# Patient Record
Sex: FEMALE | Race: WHITE | NOT HISPANIC OR LATINO | Employment: FULL TIME | URBAN - METROPOLITAN AREA
[De-identification: names, ages, dates, MRNs, and addresses within clinical notes are randomized per-mention and may not be internally consistent; named-entity substitution may affect disease eponyms.]

---

## 2023-08-14 ENCOUNTER — HOSPITAL ENCOUNTER (OUTPATIENT)
Dept: DATA CONVERSION | Facility: HOSPITAL | Age: 63
Discharge: HOME | End: 2023-08-14

## 2023-08-14 DIAGNOSIS — R87.811 VAGINAL HIGH RISK HUMAN PAPILLOMAVIRUS (HPV) DNA TEST POSITIVE: ICD-10-CM

## 2023-08-14 DIAGNOSIS — R87.620 ATYPICAL SQUAMOUS CELLS OF UNDETERMINED SIGNIFICANCE ON CYTOLOGIC SMEAR OF VAGINA (ASC-US): ICD-10-CM

## 2023-08-21 ENCOUNTER — HOSPITAL ENCOUNTER (OUTPATIENT)
Dept: DATA CONVERSION | Facility: HOSPITAL | Age: 63
End: 2023-08-21

## 2023-08-21 DIAGNOSIS — Z12.31 ENCOUNTER FOR SCREENING MAMMOGRAM FOR MALIGNANT NEOPLASM OF BREAST: ICD-10-CM

## 2023-09-03 PROBLEM — R70.0 ELEVATED ERYTHROCYTE SEDIMENTATION RATE: Status: ACTIVE | Noted: 2023-09-03

## 2023-09-03 PROBLEM — R19.5 POSITIVE COLORECTAL CANCER SCREENING USING COLOGUARD TEST: Status: ACTIVE | Noted: 2022-07-18

## 2023-09-03 PROBLEM — L23.7 CONTACT DERMATITIS DUE TO POISON IVY: Status: ACTIVE | Noted: 2023-09-03

## 2023-09-03 PROBLEM — E55.9 VITAMIN D DEFICIENCY: Status: ACTIVE | Noted: 2023-09-03

## 2023-09-03 PROBLEM — M25.50 ARTHRALGIA: Status: ACTIVE | Noted: 2022-05-13

## 2023-09-03 PROBLEM — M06.09 POLYARTHRITIS WITH NEGATIVE RHEUMATOID FACTOR (MULTI): Status: ACTIVE | Noted: 2023-09-03

## 2023-09-03 PROBLEM — L40.50 ARTHRITIS WITH PSORIASIS (MULTI): Status: ACTIVE | Noted: 2023-09-03

## 2023-09-03 PROBLEM — L40.9 PSORIASIS: Status: ACTIVE | Noted: 2022-05-13

## 2023-09-03 PROBLEM — A63.0 ANOGENITAL WARTS: Status: ACTIVE | Noted: 2023-09-03

## 2023-09-03 PROBLEM — R35.0 URINARY FREQUENCY: Status: ACTIVE | Noted: 2022-07-18

## 2023-09-03 PROBLEM — R89.6 ABNORMAL CYTOLOGICAL FINDINGS IN SPECIMENS FROM OTHER ORGANS, SYSTEMS AND TISSUES: Status: ACTIVE | Noted: 2023-09-03

## 2023-09-03 PROBLEM — R87.619 ABNORMAL CERVICAL PAPANICOLAOU SMEAR: Status: ACTIVE | Noted: 2023-09-03

## 2023-09-03 RX ORDER — ERGOCALCIFEROL 1.25 MG/1
1 CAPSULE ORAL
COMMUNITY
Start: 2023-02-27

## 2023-09-03 RX ORDER — APREMILAST 30 MG/1
30 TABLET, FILM COATED ORAL 2 TIMES DAILY
COMMUNITY
Start: 2022-10-05 | End: 2024-02-07 | Stop reason: SDUPTHER

## 2023-09-03 RX ORDER — IBUPROFEN 100 MG/5ML
1 SUSPENSION, ORAL (FINAL DOSE FORM) ORAL DAILY
COMMUNITY

## 2023-09-03 RX ORDER — NYSTATIN 100000 U/G
CREAM TOPICAL 2 TIMES DAILY
COMMUNITY
Start: 2016-09-13 | End: 2024-02-07 | Stop reason: WASHOUT

## 2023-09-03 RX ORDER — MELOXICAM 15 MG/1
1 TABLET ORAL DAILY
COMMUNITY
Start: 2022-10-19 | End: 2024-02-07 | Stop reason: WASHOUT

## 2023-09-03 RX ORDER — FLUCONAZOLE 150 MG/1
1 TABLET ORAL DAILY
COMMUNITY
Start: 2016-09-13 | End: 2024-02-07 | Stop reason: ALTCHOICE

## 2023-09-16 VITALS
DIASTOLIC BLOOD PRESSURE: 80 MMHG | SYSTOLIC BLOOD PRESSURE: 122 MMHG | WEIGHT: 170.1 LBS | BODY MASS INDEX: 30.14 KG/M2 | HEIGHT: 63 IN

## 2023-10-02 ENCOUNTER — APPOINTMENT (OUTPATIENT)
Dept: RADIOLOGY | Facility: HOSPITAL | Age: 63
End: 2023-10-02
Payer: COMMERCIAL

## 2023-10-02 ENCOUNTER — HOSPITAL ENCOUNTER (OUTPATIENT)
Dept: RADIOLOGY | Facility: HOSPITAL | Age: 63
Discharge: HOME | End: 2023-10-02
Payer: COMMERCIAL

## 2023-10-02 VITALS — BODY MASS INDEX: 29.23 KG/M2 | WEIGHT: 165 LBS | HEIGHT: 63 IN

## 2023-10-02 DIAGNOSIS — Z12.31 ENCOUNTER FOR SCREENING MAMMOGRAM FOR MALIGNANT NEOPLASM OF BREAST: ICD-10-CM

## 2023-10-02 PROCEDURE — 77067 SCR MAMMO BI INCL CAD: CPT | Mod: 50

## 2023-10-02 PROCEDURE — 77063 BREAST TOMOSYNTHESIS BI: CPT | Mod: 50

## 2023-10-17 ENCOUNTER — TELEPHONE (OUTPATIENT)
Dept: RHEUMATOLOGY | Facility: CLINIC | Age: 63
End: 2023-10-17
Payer: COMMERCIAL

## 2023-10-17 DIAGNOSIS — L40.9 PSORIASIS: Primary | ICD-10-CM

## 2023-10-17 NOTE — TELEPHONE ENCOUNTER
PT LEFT VM STATING SHE WOULD LIKE TO TRY OTEZLA. STATES YOU ORDERED LAST YEAR & SHE NEVER STARTED, NOW SXS ARE WORSE & SHE WOULD LIKE TO TRY IT. PLEASE ADVISE. LAST VISIT WAS 7/24/23 & SCHEDULED FOR 12/18/23.

## 2023-10-19 RX ORDER — APREMILAST 30 MG/1
30 TABLET, FILM COATED ORAL 2 TIMES DAILY
Qty: 60 TABLET | Refills: 11 | Status: SHIPPED | OUTPATIENT
Start: 2023-10-19 | End: 2024-03-22 | Stop reason: SDUPTHER

## 2023-10-20 ENCOUNTER — SPECIALTY PHARMACY (OUTPATIENT)
Dept: PHARMACY | Facility: CLINIC | Age: 63
End: 2023-10-20

## 2023-11-01 ENCOUNTER — SPECIALTY PHARMACY (OUTPATIENT)
Dept: PHARMACY | Facility: CLINIC | Age: 63
End: 2023-11-01

## 2023-11-01 ENCOUNTER — PHARMACY VISIT (OUTPATIENT)
Dept: PHARMACY | Facility: CLINIC | Age: 63
End: 2023-11-01
Payer: COMMERCIAL

## 2023-11-01 PROCEDURE — RXMED WILLOW AMBULATORY MEDICATION CHARGE

## 2023-11-03 ENCOUNTER — SPECIALTY PHARMACY (OUTPATIENT)
Dept: PHARMACY | Facility: CLINIC | Age: 63
End: 2023-11-03

## 2023-11-03 NOTE — PROGRESS NOTES
[facility] Clinical Note    Amber Valdez is a 62 y.o. female, who is on the specialty pharmacy service for management of: Dermatology Core with status of: (Enrolled)     Amber was contacted on 11/3/2023.    Refer to the encounter summary report for documentation details about patient counseling and education.      Medication Adherence  The importance of adherence was discussed with the patient and they were advised to take the medication as prescribed by their provider. Amber was encouraged to call her physician's office if they have a question regarding a missed dose.     Conclusion  Rate your quality of life on scale of 1-10: -- (unable to assess)  Rate your satisfaction with  Specialty Pharmacy on scale of 1-10: 10 - Completely satisfied      Patient advised to contact the pharmacy if there are any changes to her medication list, including prescriptions, OTC medications, herbal products, or supplements. Patient was advised of [facility]’s dispensing process, refill timeline, contact information ([phone]), and patient management follow up. Patient confirmed understanding of education conducted during assessment. All patient questions and concerns were addressed to the best of my ability. Patient was encouraged to contact the specialty pharmacy with any questions or concerns.    Confirmed follow-up outreaches are properly scheduled. Reviewed goals of therapy in the program targets.    Jacob Portillo, PharmD

## 2023-11-29 ENCOUNTER — LAB (OUTPATIENT)
Dept: LAB | Facility: LAB | Age: 63
End: 2023-11-29
Payer: COMMERCIAL

## 2023-11-29 DIAGNOSIS — L40.9 PSORIASIS, UNSPECIFIED: ICD-10-CM

## 2023-11-29 DIAGNOSIS — E55.9 VITAMIN D DEFICIENCY, UNSPECIFIED: ICD-10-CM

## 2023-11-29 DIAGNOSIS — M06.09 RHEUMATOID ARTHRITIS WITHOUT RHEUMATOID FACTOR, MULTIPLE SITES (MULTI): Primary | ICD-10-CM

## 2023-11-29 LAB
25(OH)D3 SERPL-MCNC: 47 NG/ML (ref 31–100)
ALBUMIN SERPL-MCNC: 4.5 G/DL (ref 3.5–5)
ALP BLD-CCNC: 114 U/L (ref 35–125)
ALT SERPL-CCNC: 23 U/L (ref 5–40)
ANION GAP SERPL CALC-SCNC: 11 MMOL/L
APPEARANCE UR: CLEAR
AST SERPL-CCNC: 25 U/L (ref 5–40)
BACTERIA #/AREA URNS AUTO: ABNORMAL /HPF
BASOPHILS # BLD AUTO: 0.05 X10*3/UL (ref 0–0.1)
BASOPHILS NFR BLD AUTO: 0.7 %
BILIRUB SERPL-MCNC: 0.3 MG/DL (ref 0.1–1.2)
BILIRUB UR STRIP.AUTO-MCNC: NEGATIVE MG/DL
BUN SERPL-MCNC: 12 MG/DL (ref 8–25)
CALCIUM SERPL-MCNC: 9.2 MG/DL (ref 8.5–10.4)
CHLORIDE SERPL-SCNC: 102 MMOL/L (ref 97–107)
CK SERPL-CCNC: 61 U/L (ref 24–195)
CO2 SERPL-SCNC: 26 MMOL/L (ref 24–31)
COLOR UR: COLORLESS
CREAT SERPL-MCNC: 0.9 MG/DL (ref 0.4–1.6)
EOSINOPHIL # BLD AUTO: 0.06 X10*3/UL (ref 0–0.7)
EOSINOPHIL NFR BLD AUTO: 0.8 %
ERYTHROCYTE [DISTWIDTH] IN BLOOD BY AUTOMATED COUNT: 12 % (ref 11.5–14.5)
GFR SERPL CREATININE-BSD FRML MDRD: 73 ML/MIN/1.73M*2
GLUCOSE SERPL-MCNC: 92 MG/DL (ref 65–99)
GLUCOSE UR STRIP.AUTO-MCNC: NORMAL MG/DL
HCT VFR BLD AUTO: 41.6 % (ref 36–46)
HGB BLD-MCNC: 13.5 G/DL (ref 12–16)
HOLD SPECIMEN: NORMAL
IMM GRANULOCYTES # BLD AUTO: 0.02 X10*3/UL (ref 0–0.7)
IMM GRANULOCYTES NFR BLD AUTO: 0.3 % (ref 0–0.9)
KETONES UR STRIP.AUTO-MCNC: NEGATIVE MG/DL
LEUKOCYTE ESTERASE UR QL STRIP.AUTO: ABNORMAL
LYMPHOCYTES # BLD AUTO: 2.53 X10*3/UL (ref 1.2–4.8)
LYMPHOCYTES NFR BLD AUTO: 34.3 %
MCH RBC QN AUTO: 30.3 PG (ref 26–34)
MCHC RBC AUTO-ENTMCNC: 32.5 G/DL (ref 32–36)
MCV RBC AUTO: 93 FL (ref 80–100)
MONOCYTES # BLD AUTO: 0.53 X10*3/UL (ref 0.1–1)
MONOCYTES NFR BLD AUTO: 7.2 %
NEUTROPHILS # BLD AUTO: 4.18 X10*3/UL (ref 1.2–7.7)
NEUTROPHILS NFR BLD AUTO: 56.7 %
NITRITE UR QL STRIP.AUTO: NEGATIVE
NRBC BLD-RTO: 0 /100 WBCS (ref 0–0)
PH UR STRIP.AUTO: 6 [PH]
PLATELET # BLD AUTO: 255 X10*3/UL (ref 150–450)
POTASSIUM SERPL-SCNC: 3.8 MMOL/L (ref 3.4–5.1)
PROT SERPL-MCNC: 7.5 G/DL (ref 5.9–7.9)
PROT UR STRIP.AUTO-MCNC: NEGATIVE MG/DL
RBC # BLD AUTO: 4.46 X10*6/UL (ref 4–5.2)
RBC # UR STRIP.AUTO: NEGATIVE /UL
RBC #/AREA URNS AUTO: ABNORMAL /HPF
SODIUM SERPL-SCNC: 139 MMOL/L (ref 133–145)
SP GR UR STRIP.AUTO: 1
SQUAMOUS #/AREA URNS AUTO: ABNORMAL /HPF
UROBILINOGEN UR STRIP.AUTO-MCNC: NORMAL MG/DL
WBC # BLD AUTO: 7.4 X10*3/UL (ref 4.4–11.3)
WBC #/AREA URNS AUTO: ABNORMAL /HPF

## 2023-11-29 PROCEDURE — 81490 AUTOIMMUNE RA ALYS 12 BMRK: CPT

## 2023-11-29 PROCEDURE — 36415 COLL VENOUS BLD VENIPUNCTURE: CPT

## 2023-11-29 PROCEDURE — 82550 ASSAY OF CK (CPK): CPT

## 2023-11-29 PROCEDURE — 81001 URINALYSIS AUTO W/SCOPE: CPT

## 2023-11-29 PROCEDURE — 85025 COMPLETE CBC W/AUTO DIFF WBC: CPT

## 2023-11-29 PROCEDURE — 87086 URINE CULTURE/COLONY COUNT: CPT

## 2023-11-29 PROCEDURE — 82306 VITAMIN D 25 HYDROXY: CPT

## 2023-11-29 PROCEDURE — 83529 ASAY OF INTERLEUKIN-6 (IL-6): CPT

## 2023-11-29 PROCEDURE — 80053 COMPREHEN METABOLIC PANEL: CPT

## 2023-11-30 ENCOUNTER — SPECIALTY PHARMACY (OUTPATIENT)
Dept: PHARMACY | Facility: CLINIC | Age: 63
End: 2023-11-30

## 2023-11-30 ENCOUNTER — PHARMACY VISIT (OUTPATIENT)
Dept: PHARMACY | Facility: CLINIC | Age: 63
End: 2023-11-30
Payer: COMMERCIAL

## 2023-11-30 PROCEDURE — RXMED WILLOW AMBULATORY MEDICATION CHARGE

## 2023-12-01 LAB
BACTERIA UR CULT: NORMAL
IL6 SERPL-MCNC: <2 PG/ML

## 2023-12-04 LAB — SCAN RESULT: NORMAL

## 2023-12-08 ENCOUNTER — SPECIALTY PHARMACY (OUTPATIENT)
Dept: PHARMACY | Facility: CLINIC | Age: 63
End: 2023-12-08

## 2023-12-18 ENCOUNTER — APPOINTMENT (OUTPATIENT)
Dept: RHEUMATOLOGY | Facility: CLINIC | Age: 63
End: 2023-12-18
Payer: COMMERCIAL

## 2024-01-05 ENCOUNTER — SPECIALTY PHARMACY (OUTPATIENT)
Dept: PHARMACY | Facility: CLINIC | Age: 64
End: 2024-01-05

## 2024-01-05 PROCEDURE — RXMED WILLOW AMBULATORY MEDICATION CHARGE

## 2024-01-10 ENCOUNTER — PHARMACY VISIT (OUTPATIENT)
Dept: PHARMACY | Facility: CLINIC | Age: 64
End: 2024-01-10
Payer: COMMERCIAL

## 2024-02-01 PROCEDURE — RXMED WILLOW AMBULATORY MEDICATION CHARGE

## 2024-02-06 PROBLEM — R87.811 VAGINAL HIGH RISK HUMAN PAPILLOMAVIRUS (HPV) DNA TEST POSITIVE: Status: ACTIVE | Noted: 2024-02-06

## 2024-02-06 RX ORDER — SULFASALAZINE 500 MG/1
TABLET ORAL
COMMUNITY
Start: 2023-07-24 | End: 2024-02-07 | Stop reason: SINTOL

## 2024-02-07 ENCOUNTER — SPECIALTY PHARMACY (OUTPATIENT)
Dept: PHARMACY | Facility: CLINIC | Age: 64
End: 2024-02-07

## 2024-02-07 ENCOUNTER — OFFICE VISIT (OUTPATIENT)
Dept: RHEUMATOLOGY | Facility: CLINIC | Age: 64
End: 2024-02-07
Payer: COMMERCIAL

## 2024-02-07 VITALS
DIASTOLIC BLOOD PRESSURE: 68 MMHG | HEART RATE: 80 BPM | WEIGHT: 168.87 LBS | OXYGEN SATURATION: 98 % | HEIGHT: 64 IN | SYSTOLIC BLOOD PRESSURE: 126 MMHG | BODY MASS INDEX: 28.83 KG/M2

## 2024-02-07 DIAGNOSIS — E55.9 VITAMIN D DEFICIENCY: ICD-10-CM

## 2024-02-07 DIAGNOSIS — M06.09 POLYARTHRITIS WITH NEGATIVE RHEUMATOID FACTOR (MULTI): Primary | ICD-10-CM

## 2024-02-07 DIAGNOSIS — L40.9 PSORIASIS: ICD-10-CM

## 2024-02-07 DIAGNOSIS — L40.50 PSORIATIC ARTHRITIS (MULTI): ICD-10-CM

## 2024-02-07 PROCEDURE — 99213 OFFICE O/P EST LOW 20 MIN: CPT | Performed by: INTERNAL MEDICINE

## 2024-02-07 ASSESSMENT — ROUTINE ASSESSMENT OF PATIENT INDEX DATA (RAPID3)
WASH_DRY_BODY: WITHOUT ANY DIFFICULTY
ON A SCALE OF ONE TO TEN, HOW MUCH PAIN HAVE YOU HAD BECAUSE OF YOUR CONDITION OVER THE PAST WEEK?: 0
GOOD_NIGHTS_SLEEP: WITHOUT ANY DIFFICULTY
WALK_KILOMETERS: WITHOUT ANY DIFFICULTY
WEIGHTED_TOTAL_SCORE: 0
IN_OUT_BED: WITHOUT ANY DIFFICULTY
PARTIPATE_RECREATIONAL_ACTIVITIES: WITHOUT ANY DIFFICULTY
IN_OUT_TRANSPORT: WITHOUT ANY DIFFICULTY
FN_SCORE: 0
SEVERITY_SCORE: 0
TOTAL RAPID3 SCORE: 0
TURN_FAUCETS_OFF: WITHOUT ANY DIFFICULTY
ON A SCALE OF ONE TO TEN, HOW MUCH PAIN HAVE YOU HAD BECAUSE OF YOUR CONDITION OVER THE PAST WEEK?: 0
SUM OF QUESTIONS A TO J: 0
WALK_FLAT_GROUND: WITHOUT ANY DIFFICULTY
ON A SCALE OF ONE TO TEN, CONSIDERING ALL THE WAYS IN WHICH ILLNESS AND HEALTH CONDITIONS MAY AFFECT YOU AT THIS TIME, PLEASE INDICATE BELOW HOW YOU ARE DOING:: 0
FEELINGS_DEPRESSION: WITHOUT ANY DIFFICULTY
ON A SCALE OF ONE TO TEN, CONSIDERING ALL THE WAYS IN WHICH ILLNESS AND HEALTH CONDITIONS MAY AFFECT YOU AT THIS TIME, PLEASE INDICATE BELOW HOW YOU ARE DOING:: 0
FEELINGS_ANXIETY_NERVOUS: WITHOUT ANY DIFFICULTY
PICK_CLOTHES_OFF_FLOOR: WITHOUT ANY DIFFICULTY
DRESS_YOURSELF: WITHOUT ANY DIFFICULTY
LIFT_CUP_TO_MOUTH: WITHOUT ANY DIFFICULTY

## 2024-02-07 ASSESSMENT — PATIENT HEALTH QUESTIONNAIRE - PHQ9
1. LITTLE INTEREST OR PLEASURE IN DOING THINGS: NOT AT ALL
SUM OF ALL RESPONSES TO PHQ9 QUESTIONS 1 AND 2: 0
2. FEELING DOWN, DEPRESSED OR HOPELESS: NOT AT ALL

## 2024-02-07 ASSESSMENT — ENCOUNTER SYMPTOMS
OCCASIONAL FEELINGS OF UNSTEADINESS: 0
DEPRESSION: 0
LOSS OF SENSATION IN FEET: 0

## 2024-02-07 ASSESSMENT — PAIN SCALES - GENERAL: PAINLEVEL_OUTOF10: 0 - NO PAIN

## 2024-02-07 ASSESSMENT — PAIN - FUNCTIONAL ASSESSMENT: PAIN_FUNCTIONAL_ASSESSMENT: 0-10

## 2024-02-07 NOTE — PROGRESS NOTES
[facility]/  Rheumatology  [address], [address]  [place] [address]  Phone: [phone]  Fax: [phone]    Rheumatology Progress Note 2/7/24    Amber Valdez is a 63 y.o. female here for   Chief Complaint   Patient presents with    Follow-up     labs       Last Visit: 7/24/23    Rheum Hx      Previous Tx    Health Maintenance  DXA T-0.8 (R hip; 10/2022); FRAX 6.7%/0.3%  Malignancy Hx- none  Immunization History   Administered Date(s) Administered    Flu vaccine (IIV4), preservative free *Check age/dose* 09/21/2020, 10/07/2022, 10/17/2023    Pfizer Purple Cap SARS-CoV-2 03/16/2021, 04/08/2021, 11/27/2021    Pneumococcal conjugate vaccine, 20-valent (PREVNAR 20) 05/13/2022    Td vaccine, age 7 years and older (TDVAX) 06/01/1998    Td vaccine, age 7 years and older (TENIVAC) 05/13/2022          Past Medical History:   Diagnosis Date    Hepatic cyst     Psoriasis     Psoriatic arthritis (CMS/HCC)       Past Surgical History:   Procedure Laterality Date    BI STEREOTACTIC GUIDED BREAST LEFT LOCALIZATION Left 12/21/2012    BI STEREOTACTIC GUIDED BREAST LOCALIZATION LEFT [facility]    BREAST BIOPSY Right     x3    BREAST BIOPSY Left     x5    TONSILLECTOMY  06/26/2015    Tonsillectomy    WISDOM TOOTH EXTRACTION        Current Outpatient Medications   Medication Sig Dispense Refill    apremilast (Otezla) 30 mg tablet Take one (1) tablet by mouth twice daily.  Do not crush, chew, or split tablets. 60 tablet 11    ascorbic acid (Vitamin C) 1,000 mg tablet Take 1 tablet (1,000 mg) by mouth once daily.      ergocalciferol (Vitamin D-2) 1.25 MG (54515 UT) capsule Take 1 capsule (1,250 mcg) by mouth 1 (one) time per week.      MULTIVITAMIN ORAL Take 1 tablet by mouth once daily.       No current facility-administered medications for this visit.      No Known Allergies     Vitals:    02/07/24 1300   BP: 126/68   Pulse: 80   SpO2: 98%   Weight: 76.6 kg (168 lb 14 oz)   Height:  "1.613 m (5' 3.5\")     Pain Assessment Pain Score: 0 - No pain     Rapid 3  Function Score (FN): 0  Pain Score (PN) (0-10): 0  Patient Global (PTGL) (0-10): 0  Rapid3 Score: 0  RAPID3 Weighted Score: 0       Workup  Component      Latest Ref Rn 11/29/2023   WBC      4.4 - 11.3 x10*3/uL 7.4    nRBC      0.0 - 0.0 /100 WBCs 0.0    RBC      4.00 - 5.20 x10*6/uL 4.46    HEMOGLOBIN      12.0 - 16.0 g/dL 13.5    HEMATOCRIT      36.0 - 46.0 % 41.6    MCV      80 - 100 fL 93    MCH      26.0 - 34.0 pg 30.3    MCHC      32.0 - 36.0 g/dL 32.5    RED CELL DISTRIBUTION WIDTH      11.5 - 14.5 % 12.0    Platelets      150 - 450 x10*3/uL 255    Neutrophils %      40.0 - 80.0 % 56.7    Immature Granulocytes %, Automated      0.0 - 0.9 % 0.3    Lymphocytes %      13.0 - 44.0 % 34.3    Monocytes %      2.0 - 10.0 % 7.2    Eosinophils %      0.0 - 6.0 % 0.8    Basophils %      0.0 - 2.0 % 0.7    Neutrophils Absolute      1.20 - 7.70 x10*3/uL 4.18    Immature Granulocytes Absolute, Automated      0.00 - 0.70 x10*3/uL 0.02    Lymphocytes Absolute      1.20 - 4.80 x10*3/uL 2.53    Monocytes Absolute      0.10 - 1.00 x10*3/uL 0.53    Eosinophils Absolute      0.00 - 0.70 x10*3/uL 0.06    Basophils Absolute      0.00 - 0.10 x10*3/uL 0.05    GLUCOSE      65 - 99 mg/dL 92    SODIUM      133 - 145 mmol/L 139    POTASSIUM      3.4 - 5.1 mmol/L 3.8    CHLORIDE      97 - 107 mmol/L 102    Bicarbonate      24 - 31 mmol/L 26    Blood Urea Nitrogen      8 - 25 mg/dL 12    Creatinine      0.40 - 1.60 mg/dL 0.90    EGFR      >60 mL/min/1.73m*2 73    Calcium      8.5 - 10.4 mg/dL 9.2    Albumin      3.5 - 5.0 g/dL 4.5    Alkaline Phosphatase      35 - 125 U/L 114    Total Protein      5.9 - 7.9 g/dL 7.5    AST      5 - 40 U/L 25    Bilirubin Total      0.1 - 1.2 mg/dL 0.3    ALT      5 - 40 U/L 23    Anion Gap      <=19 mmol/L 11    Color, Urine      Light-Yellow, Yellow, Dark-Yellow  Colorless ! (N)    Appearance, Urine      Clear  Clear    Specific " Gravity, Urine      1.005 - 1.035  1.003 ! (N)    pH, Urine      5.0, 5.5, 6.0, 6.5, 7.0, 7.5, 8.0  6.0    Protein, Urine      NEGATIVE, 10 (TRACE), 20 (TRACE) mg/dL NEGATIVE    Glucose, Urine      Normal mg/dL Normal    Blood, Urine      NEGATIVE  NEGATIVE    Ketones, Urine      NEGATIVE mg/dL NEGATIVE    Bilirubin, Urine      NEGATIVE  NEGATIVE    Urobilinogen, Urine      Normal mg/dL Normal    Nitrite, Urine      NEGATIVE  NEGATIVE    Leukocyte Esterase, Urine      NEGATIVE  25 Neha/µL !    WBC, Urine      1-5, NONE /HPF 1-5    RBC, Urine      NONE, 1-2, 3-5 /HPF 1-2    Squamous Epithelial Cells, Urine      Reference range not established. /HPF 1-9 (SPARSE)    Bacteria, Urine      NONE SEEN /HPF 1+ !    Extra Tube Hold for add-ons.    Creatine Kinase      24 - 195 U/L 61    Interleukin 6      <=2.0 pg/mL <2.0    Vitamin D, 25-Hydroxy, Total      31 - 100 ng/mL 47    Vectra Scan Result 46 (High)       Assessment/Plan  1. Polyarthritis with negative rheumatoid factor (CMS/[facility])    2. Psoriasis    3. Psoriatic arthritis (CMS/[facility])    4. Vitamin D deficiency       Orders Placed This Encounter   Procedures    CBC and Auto Differential    Comprehensive Metabolic Panel    C-Reactive Protein    Creatine Kinase    Sedimentation Rate    Urinalysis with Reflex Culture and Microscopic    Vectra; [facility]; 810991 - Miscellaneous Test    Vitamin D 25-Hydroxy,Total (for eval of Vitamin D levels)              Since last appt, adherent and tolerating Otezla 30 mg twice daily.  Denies any recent or current infection.  Not on any NSAIDs or glucocorticoids.  ROS negative  Rapid 3 consistent with at remission.  Labs reviewed  D/w pt tx options Advised of possible side effects and importance of monitoring.   All questions answered.  Patient to follow up with primary care provider regarding all other medical issues not addressed today and for medical chart updating.    Allison Adkins MD      Patient Care Team:  Fabi Hilliard,  APRN-CNP as PCP - General  MD Tahmina Reyes DO as Primary Care Provider  Fabi Hilliard, BRANDON-CNP as Primary Care Provider  Allison Adkins MD as Consulting Physician (Rheumatology)

## 2024-02-13 ENCOUNTER — SPECIALTY PHARMACY (OUTPATIENT)
Dept: PHARMACY | Facility: CLINIC | Age: 64
End: 2024-02-13

## 2024-02-15 ENCOUNTER — PHARMACY VISIT (OUTPATIENT)
Dept: PHARMACY | Facility: CLINIC | Age: 64
End: 2024-02-15
Payer: COMMERCIAL

## 2024-02-26 ENCOUNTER — SPECIALTY PHARMACY (OUTPATIENT)
Dept: PHARMACY | Facility: CLINIC | Age: 64
End: 2024-02-26

## 2024-02-26 NOTE — PROGRESS NOTES
[facility] Clinical Note    Amber Valdez is a 63 y.o. female, who is on the specialty pharmacy service of: Dermatology Core.  Amber Valdez is taking: Otezla 30mg BID.     Amber was contacted on 2/26/2024.    Refer to the encounter summary report for documentation details about patient counseling and education.      Impression/Plan  Is patient high risk? (potential patients:  pregnancy, geriatric, pediatric) no   Is laboratory follow-up needed? no  Is a clinical intervention needed?  no  Next assessment date?  4 months   Additional comments:    Medication Adherence  The importance of adherence was discussed with the patient and they were advised to take the medication as prescribed by their provider. Amber was encouraged to call her physician's office if they have a question regarding a missed dose.     Conclusion  Rate your quality of life on scale of 1-10: 10 - Completely satisfied  Rate your satisfaction with  Specialty Pharmacy on scale of 1-10: 10 - Completely satisfied      Patient advised to contact the pharmacy if there are any changes to her medication list, including prescriptions, OTC medications, herbal products, or supplements. Patient was advised of [facility]’s dispensing process, refill timeline, contact information ([phone]), and patient management follow up. Patient confirmed understanding of education conducted during assessment. All patient questions and concerns were addressed to the best of my ability. Patient was encouraged to contact the specialty pharmacy with any questions or concerns.    Confirmed follow-up outreaches are properly scheduled. Reviewed goals of therapy in the program targets.    Jacob Portillo, PharmD

## 2024-03-15 ENCOUNTER — SPECIALTY PHARMACY (OUTPATIENT)
Dept: PHARMACY | Facility: CLINIC | Age: 64
End: 2024-03-15

## 2024-03-22 DIAGNOSIS — L40.9 PSORIASIS: ICD-10-CM

## 2024-03-22 NOTE — TELEPHONE ENCOUNTER
PT LEFT VM STATING WHEN SHE TRIED TO REFILL OTEZLA WAS TOLD SHE NEEDED NEW PA. PLEASE SEND TO  SPECIALTY TO INITIATE

## 2024-03-24 RX ORDER — APREMILAST 30 MG/1
30 TABLET, FILM COATED ORAL 2 TIMES DAILY
Qty: 60 TABLET | Refills: 11 | Status: SHIPPED | OUTPATIENT
Start: 2024-03-24 | End: 2024-06-30

## 2024-03-29 ENCOUNTER — SPECIALTY PHARMACY (OUTPATIENT)
Dept: PHARMACY | Facility: CLINIC | Age: 64
End: 2024-03-29

## 2024-03-29 PROCEDURE — RXMED WILLOW AMBULATORY MEDICATION CHARGE

## 2024-04-01 ENCOUNTER — SPECIALTY PHARMACY (OUTPATIENT)
Dept: PHARMACY | Facility: CLINIC | Age: 64
End: 2024-04-01

## 2024-04-01 ENCOUNTER — PHARMACY VISIT (OUTPATIENT)
Dept: PHARMACY | Facility: CLINIC | Age: 64
End: 2024-04-01
Payer: COMMERCIAL

## 2024-04-25 PROCEDURE — RXMED WILLOW AMBULATORY MEDICATION CHARGE

## 2024-04-29 ENCOUNTER — SPECIALTY PHARMACY (OUTPATIENT)
Dept: PHARMACY | Facility: CLINIC | Age: 64
End: 2024-04-29

## 2024-04-30 ENCOUNTER — PHARMACY VISIT (OUTPATIENT)
Dept: PHARMACY | Facility: CLINIC | Age: 64
End: 2024-04-30
Payer: COMMERCIAL

## 2024-05-23 ENCOUNTER — SPECIALTY PHARMACY (OUTPATIENT)
Dept: PHARMACY | Facility: CLINIC | Age: 64
End: 2024-05-23

## 2024-05-23 PROCEDURE — RXMED WILLOW AMBULATORY MEDICATION CHARGE

## 2024-05-29 ENCOUNTER — PHARMACY VISIT (OUTPATIENT)
Dept: PHARMACY | Facility: CLINIC | Age: 64
End: 2024-05-29
Payer: COMMERCIAL

## 2024-06-19 ENCOUNTER — SPECIALTY PHARMACY (OUTPATIENT)
Dept: PHARMACY | Facility: CLINIC | Age: 64
End: 2024-06-19

## 2024-06-19 NOTE — PROGRESS NOTES
[facility] Clinical Note    Amber Valdez is a 63 y.o. female, who is on the specialty pharmacy service of: Rheumatology Core.  Amber Valdez is taking: otezla.     Amber was contacted on 6/19/2024.    Refer to the encounter summary report for documentation details about patient counseling and education.      Impression/Plan  Is patient high risk? (potential patients:  pregnancy, geriatric, pediatric)   no  Is laboratory follow-up needed? no  Is a clinical intervention needed?  no  Next assessment date?  12/19/24  Additional comments:    Medication Adherence  The importance of adherence was discussed with the patient and they were advised to take the medication as prescribed by their provider. Amber was encouraged to call her physician's office if they have a question regarding a missed dose.     QOL/Patient Satisfaction  Rate your quality of life on scale of 1-10: 10 - Completely satisfied  Rate your satisfaction with  Specialty Pharmacy on scale of 1-10: 8      Patient advised to contact the pharmacy if there are any changes to her medication list, including prescriptions, OTC medications, herbal products, or supplements. Patient was advised of [facility]’s dispensing process, refill timeline, contact information ([phone]), and patient management follow up. Patient confirmed understanding of education conducted during assessment. All patient questions and concerns were addressed to the best of my ability. Patient was encouraged to contact the specialty pharmacy with any questions or concerns.    Confirmed follow-up outreaches are properly scheduled. Reviewed goals of therapy in the program targets.    Gomez Bhat, [place]

## 2024-06-26 ENCOUNTER — SPECIALTY PHARMACY (OUTPATIENT)
Dept: PHARMACY | Facility: CLINIC | Age: 64
End: 2024-06-26

## 2024-06-26 PROCEDURE — RXMED WILLOW AMBULATORY MEDICATION CHARGE

## 2024-06-27 ENCOUNTER — PHARMACY VISIT (OUTPATIENT)
Dept: PHARMACY | Facility: CLINIC | Age: 64
End: 2024-06-27
Payer: COMMERCIAL

## 2024-07-24 ENCOUNTER — SPECIALTY PHARMACY (OUTPATIENT)
Dept: PHARMACY | Facility: CLINIC | Age: 64
End: 2024-07-24

## 2024-07-24 PROCEDURE — RXMED WILLOW AMBULATORY MEDICATION CHARGE

## 2024-07-25 ENCOUNTER — PHARMACY VISIT (OUTPATIENT)
Dept: PHARMACY | Facility: CLINIC | Age: 64
End: 2024-07-25
Payer: COMMERCIAL

## 2024-08-16 ENCOUNTER — OFFICE VISIT (OUTPATIENT)
Dept: OBSTETRICS AND GYNECOLOGY | Facility: CLINIC | Age: 64
End: 2024-08-16
Payer: COMMERCIAL

## 2024-08-16 VITALS
SYSTOLIC BLOOD PRESSURE: 120 MMHG | HEIGHT: 63 IN | BODY MASS INDEX: 30.33 KG/M2 | DIASTOLIC BLOOD PRESSURE: 73 MMHG | WEIGHT: 171.2 LBS

## 2024-08-16 DIAGNOSIS — Z01.419 WELL WOMAN EXAM WITH ROUTINE GYNECOLOGICAL EXAM: Primary | ICD-10-CM

## 2024-08-16 DIAGNOSIS — Z12.31 SCREENING MAMMOGRAM FOR BREAST CANCER: ICD-10-CM

## 2024-08-16 PROCEDURE — 99396 PREV VISIT EST AGE 40-64: CPT | Performed by: OBSTETRICS & GYNECOLOGY

## 2024-08-16 PROCEDURE — 3008F BODY MASS INDEX DOCD: CPT | Performed by: OBSTETRICS & GYNECOLOGY

## 2024-08-16 ASSESSMENT — ENCOUNTER SYMPTOMS
OCCASIONAL FEELINGS OF UNSTEADINESS: 0
LOSS OF SENSATION IN FEET: 0
DEPRESSION: 0

## 2024-08-16 ASSESSMENT — PATIENT HEALTH QUESTIONNAIRE - PHQ9
SUM OF ALL RESPONSES TO PHQ9 QUESTIONS 1 AND 2: 0
2. FEELING DOWN, DEPRESSED OR HOPELESS: NOT AT ALL
1. LITTLE INTEREST OR PLEASURE IN DOING THINGS: NOT AT ALL

## 2024-08-16 ASSESSMENT — PAIN SCALES - GENERAL: PAINLEVEL: 0-NO PAIN

## 2024-08-16 NOTE — PROGRESS NOTES
Subjective   Amber Valdez is a 63 y.o.  female who presents for annual exam. The patient has no complaints today. The patient is sexually active. GYN screening history: last pap: was normal. The patient is not taking hormone replacement therapy. Patient denies post-menopausal vaginal bleeding.. The patient wears seatbelts: yes. The patient participates in regular exercise: yes. Has the patient ever been transfused or tattooed?: not asked. The patient reports that there is not domestic violence in their life.     Menstrual History:  OB History          2    Para   2    Term   1       1    AB        Living   2         SAB        IAB        Ectopic        Multiple        Live Births                    No LMP recorded. Patient is premenopausal.         Past Medical History:   Diagnosis Date    Hepatic cyst     Psoriasis     Psoriatic arthritis (Multi)        Past Surgical History:   Procedure Laterality Date    BI STEREOTACTIC GUIDED BREAST LEFT LOCALIZATION Left 2012    BI STEREOTACTIC GUIDED BREAST LOCALIZATION LEFT [facility] LE[facility]    BREAST BIOPSY Right     x3    BREAST BIOPSY Left     x5    TONSILLECTOMY  2015    Tonsillectomy    WISDOM TOOTH EXTRACTION          No Known Allergies      Family History   Problem Relation Name Age of Onset    Cancer Mother          40s/50s    Cancer Father          40s/50s    No Known Problems Sister      No Known Problems Brother      No Known Problems Daughter      No Known Problems Son          Social History     Socioeconomic History    Marital status:      Spouse name: Marvin    Number of children: 2    Years of education: Not on file    Highest education level: Bachelor's degree (e.g., BA, AB, BS)   Occupational History    Not on file   Tobacco Use    Smoking status: Never    Smokeless tobacco: Never   Vaping Use    Vaping status: Never Used   Substance and Sexual Activity    Alcohol use: Not Currently     Comment: last drink over 5  "years ago    Drug use: Never    Sexual activity: Not Currently   Other Topics Concern    Not on file   Social History Narrative    Not on file     Social Determinants of Health     Financial Resource Strain: Not on file   Food Insecurity: Not on file   Transportation Needs: Not on file   Physical Activity: Not on file   Stress: Not on file   Social Connections: Not on file   Intimate Partner Violence: Not on file   Housing Stability: Not on file            Objective   /73   Ht 1.6 m (5' 3\")   Wt 77.7 kg (171 lb 3.2 oz)   BMI 30.33 kg/m²     Physical Exam  Vitals and nursing note reviewed.   Constitutional:       General: She is not in acute distress.     Appearance: Normal appearance. She is not ill-appearing.   HENT:      Head: Normocephalic and atraumatic.      Mouth/Throat:      Mouth: Mucous membranes are moist.      Pharynx: Oropharynx is clear.   Eyes:      Extraocular Movements: Extraocular movements intact.      Conjunctiva/sclera: Conjunctivae normal.      Pupils: Pupils are equal, round, and reactive to light.   Neck:      Thyroid: No thyroid mass, thyromegaly or thyroid tenderness.   Cardiovascular:      Rate and Rhythm: Normal rate and regular rhythm.      Pulses: Normal pulses.      Heart sounds: Normal heart sounds. No murmur heard.  Pulmonary:      Effort: Pulmonary effort is normal.      Breath sounds: No wheezing or rhonchi.   Chest:   Breasts:     Right: Normal. No swelling, bleeding, inverted nipple, mass, nipple discharge, skin change or tenderness.      Left: Normal. No swelling, bleeding, inverted nipple, mass, nipple discharge, skin change or tenderness.   Abdominal:      General: Bowel sounds are normal. There is no distension.      Palpations: There is no mass.      Tenderness: There is no abdominal tenderness. There is no guarding or rebound.      Hernia: No hernia is present. There is no hernia in the left inguinal area or right inguinal area.   Genitourinary:     General: Normal " vulva.      Pubic Area: No rash.       Labia:         Right: No rash, tenderness, lesion or injury.         Left: No rash, tenderness, lesion or injury.       Urethra: No prolapse or urethral swelling.      Vagina: No signs of injury. No vaginal discharge, tenderness or prolapsed vaginal walls.      Cervix: No cervical motion tenderness, discharge, friability, lesion, erythema or cervical bleeding.      Uterus: Not deviated, not enlarged, not fixed, not tender and no uterine prolapse.       Adnexa:         Right: No mass, tenderness or fullness.          Left: No mass, tenderness or fullness.     Musculoskeletal:         General: No swelling, tenderness, deformity or signs of injury. Normal range of motion.      Cervical back: No rigidity.   Lymphadenopathy:      Cervical: No cervical adenopathy.      Upper Body:      Right upper body: No axillary adenopathy.      Left upper body: No axillary adenopathy.      Lower Body: No right inguinal adenopathy. No left inguinal adenopathy.   Skin:     General: Skin is warm.      Findings: No lesion or rash.   Neurological:      General: No focal deficit present.      Mental Status: She is alert and oriented to person, place, and time.   Psychiatric:         Mood and Affect: Mood normal.         Behavior: Behavior normal.         Thought Content: Thought content normal.         Judgment: Judgment normal.            Assessment/Plan   Problem List Items Addressed This Visit    None  Visit Diagnoses       Well woman exam with routine gynecological exam    -  Primary    Screening mammogram for breast cancer        Relevant Orders    BI mammo bilateral screening tomosynthesis               All questions answered.  Breast self exam technique reviewed and patient encouraged to perform self-exam monthly.  Diagnosis explained in detail, including differential.  Discussed healthy lifestyle modifications.

## 2024-08-21 ENCOUNTER — SPECIALTY PHARMACY (OUTPATIENT)
Dept: PHARMACY | Facility: CLINIC | Age: 64
End: 2024-08-21

## 2024-08-21 PROCEDURE — RXMED WILLOW AMBULATORY MEDICATION CHARGE

## 2024-08-26 ENCOUNTER — PHARMACY VISIT (OUTPATIENT)
Dept: PHARMACY | Facility: CLINIC | Age: 64
End: 2024-08-26
Payer: COMMERCIAL

## 2024-09-26 ENCOUNTER — SPECIALTY PHARMACY (OUTPATIENT)
Dept: PHARMACY | Facility: CLINIC | Age: 64
End: 2024-09-26

## 2024-09-26 PROCEDURE — RXMED WILLOW AMBULATORY MEDICATION CHARGE

## 2024-09-27 ENCOUNTER — PHARMACY VISIT (OUTPATIENT)
Dept: PHARMACY | Facility: CLINIC | Age: 64
End: 2024-09-27
Payer: COMMERCIAL

## 2024-10-08 ENCOUNTER — HOSPITAL ENCOUNTER (OUTPATIENT)
Dept: RADIOLOGY | Facility: HOSPITAL | Age: 64
Discharge: HOME | End: 2024-10-08
Payer: COMMERCIAL

## 2024-10-08 VITALS — BODY MASS INDEX: 30.12 KG/M2 | WEIGHT: 170 LBS | HEIGHT: 63 IN

## 2024-10-08 DIAGNOSIS — Z12.31 SCREENING MAMMOGRAM FOR BREAST CANCER: ICD-10-CM

## 2024-10-08 PROCEDURE — 77067 SCR MAMMO BI INCL CAD: CPT | Performed by: RADIOLOGY

## 2024-10-08 PROCEDURE — 77063 BREAST TOMOSYNTHESIS BI: CPT | Performed by: RADIOLOGY

## 2024-10-08 PROCEDURE — 77063 BREAST TOMOSYNTHESIS BI: CPT

## 2024-10-23 ENCOUNTER — LAB (OUTPATIENT)
Dept: LAB | Facility: LAB | Age: 64
End: 2024-10-23
Payer: COMMERCIAL

## 2024-10-23 DIAGNOSIS — M06.09 POLYARTHRITIS WITH NEGATIVE RHEUMATOID FACTOR (MULTI): ICD-10-CM

## 2024-10-23 DIAGNOSIS — E55.9 VITAMIN D DEFICIENCY: ICD-10-CM

## 2024-10-23 LAB
ALBUMIN SERPL BCP-MCNC: 4.2 G/DL (ref 3.4–5)
ALP SERPL-CCNC: 86 U/L (ref 33–136)
ALT SERPL W P-5'-P-CCNC: 28 U/L (ref 7–45)
ANION GAP SERPL CALCULATED.3IONS-SCNC: 9 MMOL/L (ref 10–20)
APPEARANCE UR: CLEAR
AST SERPL W P-5'-P-CCNC: 29 U/L (ref 9–39)
BASOPHILS # BLD AUTO: 0.06 X10*3/UL (ref 0–0.1)
BASOPHILS NFR BLD AUTO: 0.9 %
BILIRUB SERPL-MCNC: 0.7 MG/DL (ref 0–1.2)
BILIRUB UR STRIP.AUTO-MCNC: NEGATIVE MG/DL
BUN SERPL-MCNC: 12 MG/DL (ref 6–23)
CALCIUM SERPL-MCNC: 9.4 MG/DL (ref 8.6–10.3)
CHLORIDE SERPL-SCNC: 103 MMOL/L (ref 98–107)
CK SERPL-CCNC: 87 U/L (ref 0–215)
CO2 SERPL-SCNC: 30 MMOL/L (ref 21–32)
COLOR UR: NORMAL
CREAT SERPL-MCNC: 0.85 MG/DL (ref 0.5–1.05)
CRP SERPL-MCNC: 0.59 MG/DL
EGFRCR SERPLBLD CKD-EPI 2021: 78 ML/MIN/1.73M*2
EOSINOPHIL # BLD AUTO: 0.05 X10*3/UL (ref 0–0.7)
EOSINOPHIL NFR BLD AUTO: 0.8 %
ERYTHROCYTE [DISTWIDTH] IN BLOOD BY AUTOMATED COUNT: 11.9 % (ref 11.5–14.5)
ERYTHROCYTE [SEDIMENTATION RATE] IN BLOOD BY WESTERGREN METHOD: 20 MM/H (ref 0–30)
GLUCOSE SERPL-MCNC: 79 MG/DL (ref 74–99)
GLUCOSE UR STRIP.AUTO-MCNC: NORMAL MG/DL
HCT VFR BLD AUTO: 40.8 % (ref 36–46)
HGB BLD-MCNC: 13.6 G/DL (ref 12–16)
IMM GRANULOCYTES # BLD AUTO: 0.02 X10*3/UL (ref 0–0.7)
IMM GRANULOCYTES NFR BLD AUTO: 0.3 % (ref 0–0.9)
KETONES UR STRIP.AUTO-MCNC: NEGATIVE MG/DL
LEUKOCYTE ESTERASE UR QL STRIP.AUTO: NEGATIVE
LYMPHOCYTES # BLD AUTO: 2.11 X10*3/UL (ref 1.2–4.8)
LYMPHOCYTES NFR BLD AUTO: 32.5 %
MCH RBC QN AUTO: 31.9 PG (ref 26–34)
MCHC RBC AUTO-ENTMCNC: 33.3 G/DL (ref 32–36)
MCV RBC AUTO: 96 FL (ref 80–100)
MONOCYTES # BLD AUTO: 0.49 X10*3/UL (ref 0.1–1)
MONOCYTES NFR BLD AUTO: 7.6 %
NEUTROPHILS # BLD AUTO: 3.76 X10*3/UL (ref 1.2–7.7)
NEUTROPHILS NFR BLD AUTO: 57.9 %
NITRITE UR QL STRIP.AUTO: NEGATIVE
NRBC BLD-RTO: 0 /100 WBCS (ref 0–0)
PH UR STRIP.AUTO: 6.5 [PH]
PLATELET # BLD AUTO: 242 X10*3/UL (ref 150–450)
POTASSIUM SERPL-SCNC: 3.8 MMOL/L (ref 3.5–5.3)
PROT SERPL-MCNC: 7.2 G/DL (ref 6.4–8.2)
PROT UR STRIP.AUTO-MCNC: NEGATIVE MG/DL
RBC # BLD AUTO: 4.27 X10*6/UL (ref 4–5.2)
RBC # UR STRIP.AUTO: NEGATIVE /UL
SODIUM SERPL-SCNC: 138 MMOL/L (ref 136–145)
SP GR UR STRIP.AUTO: 1.01
UROBILINOGEN UR STRIP.AUTO-MCNC: NORMAL MG/DL
WBC # BLD AUTO: 6.5 X10*3/UL (ref 4.4–11.3)

## 2024-10-23 PROCEDURE — 82306 VITAMIN D 25 HYDROXY: CPT

## 2024-10-23 PROCEDURE — 81003 URINALYSIS AUTO W/O SCOPE: CPT

## 2024-10-23 PROCEDURE — 85652 RBC SED RATE AUTOMATED: CPT

## 2024-10-23 PROCEDURE — 36415 COLL VENOUS BLD VENIPUNCTURE: CPT

## 2024-10-23 PROCEDURE — 82550 ASSAY OF CK (CPK): CPT

## 2024-10-23 PROCEDURE — 86140 C-REACTIVE PROTEIN: CPT

## 2024-10-23 PROCEDURE — 80053 COMPREHEN METABOLIC PANEL: CPT

## 2024-10-23 PROCEDURE — 81490 AUTOIMMUNE RA ALYS 12 BMRK: CPT

## 2024-10-23 PROCEDURE — 85025 COMPLETE CBC W/AUTO DIFF WBC: CPT

## 2024-10-24 LAB
25(OH)D3 SERPL-MCNC: 38 NG/ML (ref 30–100)
HOLD SPECIMEN: NORMAL

## 2024-10-29 LAB — SCAN RESULT: NORMAL

## 2024-11-03 ENCOUNTER — SPECIALTY PHARMACY (OUTPATIENT)
Dept: PHARMACY | Facility: CLINIC | Age: 64
End: 2024-11-03

## 2024-11-03 PROCEDURE — RXMED WILLOW AMBULATORY MEDICATION CHARGE

## 2024-11-05 ENCOUNTER — TELEPHONE (OUTPATIENT)
Dept: OBSTETRICS AND GYNECOLOGY | Facility: CLINIC | Age: 64
End: 2024-11-05
Payer: COMMERCIAL

## 2024-11-05 ENCOUNTER — SPECIALTY PHARMACY (OUTPATIENT)
Dept: PHARMACY | Facility: CLINIC | Age: 64
End: 2024-11-05

## 2024-11-05 DIAGNOSIS — R92.8 ABNORMALITY OF RIGHT BREAST ON SCREENING MAMMOGRAM: Primary | ICD-10-CM

## 2024-11-05 NOTE — TELEPHONE ENCOUNTER
Patient needed additional imaging following mammogram, but only an ultrasound was ordered. Radiology requesting an order for  RT diagnostic mammogram with ultrasound if indicated

## 2024-11-06 ENCOUNTER — OFFICE VISIT (OUTPATIENT)
Dept: RHEUMATOLOGY | Facility: CLINIC | Age: 64
End: 2024-11-06
Payer: COMMERCIAL

## 2024-11-06 VITALS
WEIGHT: 173.2 LBS | SYSTOLIC BLOOD PRESSURE: 131 MMHG | HEART RATE: 74 BPM | BODY MASS INDEX: 30.68 KG/M2 | OXYGEN SATURATION: 99 % | DIASTOLIC BLOOD PRESSURE: 73 MMHG

## 2024-11-06 DIAGNOSIS — Z78.0 POSTMENOPAUSAL: ICD-10-CM

## 2024-11-06 DIAGNOSIS — L40.50 PSORIATIC ARTHRITIS (MULTI): Primary | ICD-10-CM

## 2024-11-06 DIAGNOSIS — M06.09 POLYARTHRITIS WITH NEGATIVE RHEUMATOID FACTOR (MULTI): ICD-10-CM

## 2024-11-06 DIAGNOSIS — L40.9 PSORIASIS: ICD-10-CM

## 2024-11-06 DIAGNOSIS — E55.9 VITAMIN D DEFICIENCY: ICD-10-CM

## 2024-11-06 DIAGNOSIS — Z79.899 ENCOUNTER FOR LONG-TERM (CURRENT) USE OF MEDICATIONS: ICD-10-CM

## 2024-11-06 PROCEDURE — 99213 OFFICE O/P EST LOW 20 MIN: CPT | Performed by: INTERNAL MEDICINE

## 2024-11-06 PROCEDURE — 1036F TOBACCO NON-USER: CPT | Performed by: INTERNAL MEDICINE

## 2024-11-06 RX ORDER — ERGOCALCIFEROL 1.25 MG/1
50000 CAPSULE ORAL
Qty: 12 CAPSULE | Refills: 1 | Status: SHIPPED | OUTPATIENT
Start: 2024-11-10 | End: 2025-02-08

## 2024-11-06 ASSESSMENT — ROUTINE ASSESSMENT OF PATIENT INDEX DATA (RAPID3)
FEELINGS_ANXIETY_NERVOUS: WITHOUT ANY DIFFICULTY
SEVERITY_SCORE: NEAR REMISSION (NR)
PICK_CLOTHES_OFF_FLOOR: WITHOUT ANY DIFFICULTY
IN_OUT_TRANSPORT: WITHOUT ANY DIFFICULTY
SUM OF QUESTIONS A TO J: 0
SEVERITY_SCORE: 0
ON A SCALE OF ONE TO TEN, CONSIDERING ALL THE WAYS IN WHICH ILLNESS AND HEALTH CONDITIONS MAY AFFECT YOU AT THIS TIME, PLEASE INDICATE BELOW HOW YOU ARE DOING:: 0
TURN_FAUCETS_OFF: WITHOUT ANY DIFFICULTY
IN_OUT_BED: WITHOUT ANY DIFFICULTY
WEIGHTED_TOTAL_SCORE: 0
FN_SCORE: 0
WALK_FLAT_GROUND: WITHOUT ANY DIFFICULTY
FEELINGS_DEPRESSION: WITHOUT ANY DIFFICULTY
ON A SCALE OF ONE TO TEN, HOW MUCH PAIN HAVE YOU HAD BECAUSE OF YOUR CONDITION OVER THE PAST WEEK?: 0
DRESS_YOURSELF: WITHOUT ANY DIFFICULTY
GOOD_NIGHTS_SLEEP: WITHOUT ANY DIFFICULTY
PARTIPATE_RECREATIONAL_ACTIVITIES: WITHOUT ANY DIFFICULTY
ON A SCALE OF ONE TO TEN, HOW MUCH PAIN HAVE YOU HAD BECAUSE OF YOUR CONDITION OVER THE PAST WEEK?: 0
WASH_DRY_BODY: WITHOUT ANY DIFFICULTY
TOTAL RAPID3 SCORE: 0
ON A SCALE OF ONE TO TEN, CONSIDERING ALL THE WAYS IN WHICH ILLNESS AND HEALTH CONDITIONS MAY AFFECT YOU AT THIS TIME, PLEASE INDICATE BELOW HOW YOU ARE DOING:: 0
WALK_KILOMETERS: WITHOUT ANY DIFFICULTY
LIFT_CUP_TO_MOUTH: WITHOUT ANY DIFFICULTY

## 2024-11-06 ASSESSMENT — ENCOUNTER SYMPTOMS
LOSS OF SENSATION IN FEET: 0
DEPRESSION: 0
OCCASIONAL FEELINGS OF UNSTEADINESS: 0

## 2024-11-06 NOTE — PROGRESS NOTES
[facility]/  Rheumatology  [address], [address]  [place] [address]  Phone: [phone]  Fax: [phone]    Rheumatology Progress Note 11/06/2024      Amber Valdez is a 63 y.o. female here for   Chief Complaint   Patient presents with    Follow-up     Follow up with labs       Last Visit: 2/7/24    Rheum Hx      Previous Tx    Health Maintenance  DXA T-0.8 (R hip; 10/2022); FRAX 6.7%/0.3%  Malignancy Hx- none  Immunization History   Administered Date(s) Administered    Flu vaccine (IIV4), preservative free *Check age/dose* 09/21/2020, 10/07/2022, 10/17/2023    Pfizer Purple Cap SARS-CoV-2 03/16/2021, 04/08/2021, 11/27/2021    Pneumococcal conjugate vaccine, 20-valent (PREVNAR 20) 05/13/2022    Td vaccine, age 7 years and older (TDVAX) 06/01/1998    Td vaccine, age 7 years and older (TENIVAC) 05/13/2022          Past Medical History:   Diagnosis Date    Hepatic cyst     Psoriasis     Psoriatic arthritis (Multi)       Past Surgical History:   Procedure Laterality Date    BI STEREOTACTIC GUIDED BREAST LEFT LOCALIZATION Left 12/21/2012    BI STEREOTACTIC GUIDED BREAST LOCALIZATION LEFT [facility] LE[facility]    BREAST BIOPSY Right     x3    BREAST BIOPSY Left     x5    TONSILLECTOMY  06/26/2015    Tonsillectomy    WISDOM TOOTH EXTRACTION        Current Outpatient Medications   Medication Sig Dispense Refill    apremilast (Otezla) 30 mg tablet Take one (1) tablet by mouth twice daily.  Do not crush, chew, or split tablets. 60 tablet 11    ascorbic acid (Vitamin C) 1,000 mg tablet Take 1 tablet (1,000 mg) by mouth once daily.      MULTIVITAMIN ORAL Take 1 tablet by mouth once daily.      [START ON 11/10/2024] ergocalciferol (Vitamin D-2) 1.25 MG (73099 UT) capsule Take 1 capsule (50,000 Units) by mouth 1 (one) time per week. 12 capsule 1     No current facility-administered medications for this visit.      No Known Allergies     Vitals:    11/06/24 1302   BP: 131/73   Pulse: 74    SpO2: 99%   Weight: 78.6 kg (173 lb 3.2 oz)           Rapid 3  Function Score (FN): 0  Pain Score (PN) (0-10): 0  Patient Global (PTGL) (0-10): 0  Rapid3 Score: 0  RAPID3 Weighted Score: 0       Workup  Component      Latest Ref Rng 10/23/2024   WBC      4.4 - 11.3 x10*3/uL 6.5    nRBC      0.0 - 0.0 /100 WBCs 0.0    RBC      4.00 - 5.20 x10*6/uL 4.27    HEMOGLOBIN      12.0 - 16.0 g/dL 13.6    HEMATOCRIT      36.0 - 46.0 % 40.8    MCV      80 - 100 fL 96    MCH      26.0 - 34.0 pg 31.9    MCHC      32.0 - 36.0 g/dL 33.3    RED CELL DISTRIBUTION WIDTH      11.5 - 14.5 % 11.9    Platelets      150 - 450 x10*3/uL 242    Neutrophils %      40.0 - 80.0 % 57.9    Immature Granulocytes %, Automated      0.0 - 0.9 % 0.3    Lymphocytes %      13.0 - 44.0 % 32.5    Monocytes %      2.0 - 10.0 % 7.6    Eosinophils %      0.0 - 6.0 % 0.8    Basophils %      0.0 - 2.0 % 0.9    Neutrophils Absolute      1.20 - 7.70 x10*3/uL 3.76    Immature Granulocytes Absolute, Automated      0.00 - 0.70 x10*3/uL 0.02    Lymphocytes Absolute      1.20 - 4.80 x10*3/uL 2.11    Monocytes Absolute      0.10 - 1.00 x10*3/uL 0.49    Eosinophils Absolute      0.00 - 0.70 x10*3/uL 0.05    Basophils Absolute      0.00 - 0.10 x10*3/uL 0.06    GLUCOSE      74 - 99 mg/dL 79    SODIUM      136 - 145 mmol/L 138    POTASSIUM      3.5 - 5.3 mmol/L 3.8    CHLORIDE      98 - 107 mmol/L 103    Bicarbonate      21 - 32 mmol/L 30    Anion Gap      10 - 20 mmol/L 9 (L)    Blood Urea Nitrogen      6 - 23 mg/dL 12    Creatinine      0.50 - 1.05 mg/dL 0.85    EGFR      >60 mL/min/1.73m*2 78    Calcium      8.6 - 10.3 mg/dL 9.4    Albumin      3.4 - 5.0 g/dL 4.2    Alkaline Phosphatase      33 - 136 U/L 86    Total Protein      6.4 - 8.2 g/dL 7.2    AST      9 - 39 U/L 29    Bilirubin Total      0.0 - 1.2 mg/dL 0.7    ALT      7 - 45 U/L 28    Color, Urine      Light-Yellow, Yellow, Dark-Yellow  Light-Yellow    Appearance, Urine      Clear  Clear    Specific Gravity,  Urine      1.005 - 1.035  1.009    pH, Urine      5.0, 5.5, 6.0, 6.5, 7.0, 7.5, 8.0  6.5    Protein, Urine      NEGATIVE, 10 (TRACE), 20 (TRACE) mg/dL NEGATIVE    Glucose, Urine      Normal mg/dL Normal    Blood, Urine      NEGATIVE  NEGATIVE    Ketones, Urine      NEGATIVE mg/dL NEGATIVE    Bilirubin, Urine      NEGATIVE  NEGATIVE    Urobilinogen, Urine      Normal mg/dL Normal    Nitrite, Urine      NEGATIVE  NEGATIVE    Leukocyte Esterase, Urine      NEGATIVE  NEGATIVE    C-Reactive Protein      <1.00 mg/dL 0.59    Creatine Kinase      0 - 215 U/L 87    Sed Rate      0 - 30 mm/h 20    Scan Result See Scanned Result    Vitamin D, 25-Hydroxy, Total      30 - 100 ng/mL 38    Extra Tube Hold for add-ons.        Assessment/Plan  1. Psoriatic arthritis (Multi)    2. Psoriasis    3. Polyarthritis with negative rheumatoid factor (Multi)    4. Vitamin D deficiency    5. Encounter for long-term (current) use of medications    6. Postmenopausal         Orders Placed This Encounter   Procedures    XR DEXA bone density axial skeleton w VFA    CBC and Auto Differential    Comprehensive Metabolic Panel    C-Reactive Protein    Creatine Kinase    Sedimentation Rate    Urinalysis with Reflex Culture and Microscopic    Vitamin D 25-Hydroxy,Total (for eval of Vitamin D levels)          Since last appt, adherent and tolerating Otezla 60 mg daily.  Denies any recent or current infection.  Not on any NSAIDs or glucocorticoids.  ROS neg  Rapid 3 consistent with at remission.  Labs reviewed  D/w pt tx options and decided on   Continue Otezla.  Replete vit D  DXA  Exercise  Advised of possible side effects and importance of monitoring.   All questions answered.  Patient to follow up with primary care provider regarding all other medical issues not addressed today and for medical chart updating.     Allison Adkins MD      Patient Care Team:  BRANDON Ribeiro-CNP as PCP - General  MD Tahmina Reyes, DO as  Primary Care Provider  BRANDON Ribeiro-CNP as Primary Care Provider  Allison Adkins MD as Consulting Physician (Rheumatology)

## 2024-11-07 ENCOUNTER — PHARMACY VISIT (OUTPATIENT)
Dept: PHARMACY | Facility: CLINIC | Age: 64
End: 2024-11-07
Payer: COMMERCIAL

## 2024-11-08 ENCOUNTER — APPOINTMENT (OUTPATIENT)
Dept: RADIOLOGY | Facility: HOSPITAL | Age: 64
End: 2024-11-08
Payer: COMMERCIAL

## 2024-11-27 ENCOUNTER — HOSPITAL ENCOUNTER (OUTPATIENT)
Dept: RADIOLOGY | Facility: HOSPITAL | Age: 64
Discharge: HOME | End: 2024-11-27
Payer: COMMERCIAL

## 2024-11-27 DIAGNOSIS — R92.8 OTHER ABNORMAL AND INCONCLUSIVE FINDINGS ON DIAGNOSTIC IMAGING OF BREAST: ICD-10-CM

## 2024-11-27 PROCEDURE — 76982 USE 1ST TARGET LESION: CPT | Mod: RT

## 2024-11-27 PROCEDURE — 76642 ULTRASOUND BREAST LIMITED: CPT | Mod: RIGHT SIDE | Performed by: RADIOLOGY

## 2024-11-27 PROCEDURE — 76642 ULTRASOUND BREAST LIMITED: CPT | Mod: RT

## 2024-12-03 ENCOUNTER — SPECIALTY PHARMACY (OUTPATIENT)
Dept: PHARMACY | Facility: CLINIC | Age: 64
End: 2024-12-03

## 2024-12-03 PROCEDURE — RXMED WILLOW AMBULATORY MEDICATION CHARGE

## 2024-12-06 ENCOUNTER — PHARMACY VISIT (OUTPATIENT)
Dept: PHARMACY | Facility: CLINIC | Age: 64
End: 2024-12-06
Payer: COMMERCIAL

## 2024-12-27 PROCEDURE — RXMED WILLOW AMBULATORY MEDICATION CHARGE

## 2025-01-09 ENCOUNTER — SPECIALTY PHARMACY (OUTPATIENT)
Dept: PHARMACY | Facility: CLINIC | Age: 65
End: 2025-01-09

## 2025-01-10 ENCOUNTER — SPECIALTY PHARMACY (OUTPATIENT)
Dept: PHARMACY | Facility: CLINIC | Age: 65
End: 2025-01-10

## 2025-01-10 NOTE — PROGRESS NOTES
[facility] Clinical Note    Amber Valdez is a 64 y.o. female, who is on the specialty pharmacy service for management of:  Rheumatology Core.    Amber Valdez is taking: Otezla 30mg by mouth twice daily.    Medication Receipt Date: 12/9/24  Medication Start Date (planned or actual): Established on therapy    Amber was contacted on 1/10/2025 at 3:41 PM for a virtual pharmacy visit with encounter number [id number] from:   [facility]  [address] [place] [id number]  Dept: [phone]  Dept Fax: [phone]    Amber was offered a Telemedicine Video visit or Telephone visit.  Amber consented to a telephone visit, which was performed.    The most recent encounter visit with the referring prescriber Dr. Allison Adkins on 11/6/24 was reviewed.  Pharmacy will continue to collaborate in the care of this patient with the referring prescriber Dr. Allison Adkins.    General Assessment      Impression/Plan  IMPRESSION/PLAN:  Is patient high risk (potential patients:  pregnancy, geriatric, pediatric)? No  Is laboratory follow-up needed? No  Is a clinical intervention needed? No  Next reassessment date? 7/10/25  Additional comments: N/A    Refer to the encounter summary report for documentation details about patient counseling and education.      Medication Adherence    The importance of adherence was discussed with the patient and they were advised to take the medication as prescribed by their provider. Patient was encouraged to call their physician's office if they have a question regarding a missed dose.     QOL/Patient Satisfaction  Rate your quality of life on scale of 1-10: 9  Rate your satisfaction with  Specialty Pharmacy on scale of 1-10: 9      Patient was advised to contact the pharmacy if there are any changes to their medication list, including prescriptions, OTC medications, herbal products, or supplements. Patient  was advised of [facility]'s dispensing process, refill timeline, contact information ([phone]), and patient management follow up. Patient confirmed understanding of education conducted during assessment. All patient questions and concerns were addressed to the best of my ability. Patient was encouraged to contact the specialty pharmacy with any questions or concerns.    Confirmed follow-up outreaches are properly scheduled and reviewed goals of therapy with the patient.        ZAKIA PENNINGTON, PharmD

## 2025-01-14 ENCOUNTER — PHARMACY VISIT (OUTPATIENT)
Dept: PHARMACY | Facility: CLINIC | Age: 65
End: 2025-01-14
Payer: COMMERCIAL

## 2025-02-07 ENCOUNTER — SPECIALTY PHARMACY (OUTPATIENT)
Dept: PHARMACY | Facility: CLINIC | Age: 65
End: 2025-02-07

## 2025-02-07 PROCEDURE — RXMED WILLOW AMBULATORY MEDICATION CHARGE

## 2025-02-10 ENCOUNTER — PHARMACY VISIT (OUTPATIENT)
Dept: PHARMACY | Facility: CLINIC | Age: 65
End: 2025-02-10
Payer: COMMERCIAL

## 2025-03-05 ENCOUNTER — APPOINTMENT (OUTPATIENT)
Dept: RADIOLOGY | Facility: HOSPITAL | Age: 65
End: 2025-03-05
Payer: COMMERCIAL

## 2025-03-18 ENCOUNTER — SPECIALTY PHARMACY (OUTPATIENT)
Dept: PHARMACY | Facility: CLINIC | Age: 65
End: 2025-03-18

## 2025-03-18 PROCEDURE — RXMED WILLOW AMBULATORY MEDICATION CHARGE

## 2025-03-19 ENCOUNTER — HOSPITAL ENCOUNTER (OUTPATIENT)
Dept: RADIOLOGY | Facility: HOSPITAL | Age: 65
Discharge: HOME | End: 2025-03-19
Payer: COMMERCIAL

## 2025-03-19 DIAGNOSIS — Z78.0 POSTMENOPAUSAL: ICD-10-CM

## 2025-03-19 PROCEDURE — 77085 DXA BONE DENSITY AXL VRT FX: CPT

## 2025-03-19 PROCEDURE — 77086 VRT FRACTURE ASSMT VIA DXA: CPT | Performed by: RADIOLOGY

## 2025-03-20 ENCOUNTER — PHARMACY VISIT (OUTPATIENT)
Dept: PHARMACY | Facility: CLINIC | Age: 65
End: 2025-03-20
Payer: COMMERCIAL

## 2025-03-28 LAB
25(OH)D3+25(OH)D2 SERPL-MCNC: 42 NG/ML (ref 30–100)
ALBUMIN SERPL-MCNC: 4.4 G/DL (ref 3.6–5.1)
ALP SERPL-CCNC: 88 U/L (ref 37–153)
ALT SERPL-CCNC: 21 U/L (ref 6–29)
ANION GAP SERPL CALCULATED.4IONS-SCNC: 9 MMOL/L (CALC) (ref 7–17)
APPEARANCE UR: CLEAR
AST SERPL-CCNC: 23 U/L (ref 10–35)
BACTERIA #/AREA URNS HPF: NORMAL /HPF
BACTERIA UR CULT: NORMAL
BASOPHILS # BLD AUTO: 39 CELLS/UL (ref 0–200)
BASOPHILS NFR BLD AUTO: 0.7 %
BILIRUB SERPL-MCNC: 0.6 MG/DL (ref 0.2–1.2)
BILIRUB UR QL STRIP: NEGATIVE
BUN SERPL-MCNC: 14 MG/DL (ref 7–25)
CALCIUM SERPL-MCNC: 9.2 MG/DL (ref 8.6–10.4)
CHLORIDE SERPL-SCNC: 105 MMOL/L (ref 98–110)
CK SERPL-CCNC: 60 U/L (ref 20–243)
CO2 SERPL-SCNC: 28 MMOL/L (ref 20–32)
COLOR UR: YELLOW
CREAT SERPL-MCNC: 0.75 MG/DL (ref 0.5–1.05)
CRP SERPL-MCNC: 3.5 MG/L
EGFRCR SERPLBLD CKD-EPI 2021: 90 ML/MIN/1.73M2
EOSINOPHIL # BLD AUTO: 90 CELLS/UL (ref 15–500)
EOSINOPHIL NFR BLD AUTO: 1.6 %
ERYTHROCYTE [DISTWIDTH] IN BLOOD BY AUTOMATED COUNT: 12 % (ref 11–15)
ERYTHROCYTE [SEDIMENTATION RATE] IN BLOOD BY WESTERGREN METHOD: 19 MM/H
GLUCOSE SERPL-MCNC: 88 MG/DL (ref 65–139)
GLUCOSE UR QL STRIP: NEGATIVE
HCT VFR BLD AUTO: 42.9 % (ref 35–45)
HGB BLD-MCNC: 14.3 G/DL (ref 11.7–15.5)
HGB UR QL STRIP: NEGATIVE
HYALINE CASTS #/AREA URNS LPF: NORMAL /LPF
KETONES UR QL STRIP: NEGATIVE
LEUKOCYTE ESTERASE UR QL STRIP: NEGATIVE
LYMPHOCYTES # BLD AUTO: 1730 CELLS/UL (ref 850–3900)
LYMPHOCYTES NFR BLD AUTO: 30.9 %
MCH RBC QN AUTO: 31.6 PG (ref 27–33)
MCHC RBC AUTO-ENTMCNC: 33.3 G/DL (ref 32–36)
MCV RBC AUTO: 94.7 FL (ref 80–100)
MONOCYTES # BLD AUTO: 482 CELLS/UL (ref 200–950)
MONOCYTES NFR BLD AUTO: 8.6 %
NEUTROPHILS # BLD AUTO: 3259 CELLS/UL (ref 1500–7800)
NEUTROPHILS NFR BLD AUTO: 58.2 %
NITRITE UR QL STRIP: NEGATIVE
PH UR STRIP: 5.5 [PH] (ref 5–8)
PLATELET # BLD AUTO: 234 THOUSAND/UL (ref 140–400)
PMV BLD REES-ECKER: 9.8 FL (ref 7.5–12.5)
POTASSIUM SERPL-SCNC: 4 MMOL/L (ref 3.5–5.3)
PROT SERPL-MCNC: 7.3 G/DL (ref 6.1–8.1)
PROT UR QL STRIP: NEGATIVE
RBC # BLD AUTO: 4.53 MILLION/UL (ref 3.8–5.1)
RBC #/AREA URNS HPF: NORMAL /HPF
SERVICE CMNT-IMP: NORMAL
SODIUM SERPL-SCNC: 142 MMOL/L (ref 135–146)
SP GR UR STRIP: 1.02 (ref 1–1.03)
SQUAMOUS #/AREA URNS HPF: NORMAL /HPF
WBC # BLD AUTO: 5.6 THOUSAND/UL (ref 3.8–10.8)
WBC #/AREA URNS HPF: NORMAL /HPF

## 2025-03-31 DIAGNOSIS — L40.9 PSORIASIS: ICD-10-CM

## 2025-03-31 RX ORDER — APREMILAST 30 MG/1
30 TABLET, FILM COATED ORAL 2 TIMES DAILY
Qty: 60 TABLET | Refills: 11 | Status: SHIPPED | OUTPATIENT
Start: 2025-03-31 | End: 2025-04-30

## 2025-04-15 ENCOUNTER — SPECIALTY PHARMACY (OUTPATIENT)
Dept: PHARMACY | Facility: CLINIC | Age: 65
End: 2025-04-15

## 2025-04-22 PROCEDURE — RXMED WILLOW AMBULATORY MEDICATION CHARGE

## 2025-04-29 ENCOUNTER — PHARMACY VISIT (OUTPATIENT)
Dept: PHARMACY | Facility: CLINIC | Age: 65
End: 2025-04-29
Payer: COMMERCIAL

## 2025-05-06 ENCOUNTER — OFFICE VISIT (OUTPATIENT)
Dept: RHEUMATOLOGY | Facility: CLINIC | Age: 65
End: 2025-05-06
Payer: COMMERCIAL

## 2025-05-06 VITALS
BODY MASS INDEX: 30.47 KG/M2 | DIASTOLIC BLOOD PRESSURE: 77 MMHG | SYSTOLIC BLOOD PRESSURE: 145 MMHG | HEART RATE: 81 BPM | OXYGEN SATURATION: 98 % | WEIGHT: 172 LBS

## 2025-05-06 DIAGNOSIS — E55.9 VITAMIN D DEFICIENCY: ICD-10-CM

## 2025-05-06 DIAGNOSIS — M06.09 POLYARTHRITIS WITH NEGATIVE RHEUMATOID FACTOR (MULTI): ICD-10-CM

## 2025-05-06 DIAGNOSIS — L40.9 PSORIASIS: ICD-10-CM

## 2025-05-06 DIAGNOSIS — Z79.899 ENCOUNTER FOR LONG-TERM (CURRENT) USE OF MEDICATIONS: ICD-10-CM

## 2025-05-06 DIAGNOSIS — L40.50 PSORIATIC ARTHRITIS (MULTI): Primary | ICD-10-CM

## 2025-05-06 PROCEDURE — 99214 OFFICE O/P EST MOD 30 MIN: CPT | Performed by: INTERNAL MEDICINE

## 2025-05-06 ASSESSMENT — ROUTINE ASSESSMENT OF PATIENT INDEX DATA (RAPID3)
WALK_FLAT_GROUND: WITHOUT ANY DIFFICULTY
TOTAL RAPID3 SCORE: 0.5
SUM OF QUESTIONS A TO J: 0
GOOD_NIGHTS_SLEEP: WITHOUT ANY DIFFICULTY
FEELINGS_ANXIETY_NERVOUS: WITHOUT ANY DIFFICULTY
DRESS_YOURSELF: WITHOUT ANY DIFFICULTY
FN_SCORE: 0
WEIGHTED_TOTAL_SCORE: 0.17
PICK_CLOTHES_OFF_FLOOR: WITHOUT ANY DIFFICULTY
FEELINGS_DEPRESSION: WITHOUT ANY DIFFICULTY
WASH_DRY_BODY: WITHOUT ANY DIFFICULTY
IN_OUT_BED: WITHOUT ANY DIFFICULTY
ON A SCALE OF ONE TO TEN, CONSIDERING ALL THE WAYS IN WHICH ILLNESS AND HEALTH CONDITIONS MAY AFFECT YOU AT THIS TIME, PLEASE INDICATE BELOW HOW YOU ARE DOING:: 0
LIFT_CUP_TO_MOUTH: WITHOUT ANY DIFFICULTY
SEVERITY_SCORE: 0
PARTIPATE_RECREATIONAL_ACTIVITIES: WITHOUT ANY DIFFICULTY
ON A SCALE OF ONE TO TEN, HOW MUCH PAIN HAVE YOU HAD BECAUSE OF YOUR CONDITION OVER THE PAST WEEK?: 0.5
IN_OUT_TRANSPORT: WITHOUT ANY DIFFICULTY
ON A SCALE OF ONE TO TEN, HOW MUCH PAIN HAVE YOU HAD BECAUSE OF YOUR CONDITION OVER THE PAST WEEK?: 0.5
WALK_KILOMETERS: WITHOUT ANY DIFFICULTY
ON A SCALE OF ONE TO TEN, CONSIDERING ALL THE WAYS IN WHICH ILLNESS AND HEALTH CONDITIONS MAY AFFECT YOU AT THIS TIME, PLEASE INDICATE BELOW HOW YOU ARE DOING:: 0
TURN_FAUCETS_OFF: WITHOUT ANY DIFFICULTY
SEVERITY_SCORE: NEAR REMISSION (NR)

## 2025-05-06 ASSESSMENT — ENCOUNTER SYMPTOMS
OCCASIONAL FEELINGS OF UNSTEADINESS: 0
DEPRESSION: 0
LOSS OF SENSATION IN FEET: 0

## 2025-05-06 ASSESSMENT — ANXIETY QUESTIONNAIRES
IF YOU CHECKED OFF ANY PROBLEMS ON THIS QUESTIONNAIRE, HOW DIFFICULT HAVE THESE PROBLEMS MADE IT FOR YOU TO DO YOUR WORK, TAKE CARE OF THINGS AT HOME, OR GET ALONG WITH OTHER PEOPLE: NOT DIFFICULT AT ALL

## 2025-05-06 NOTE — PROGRESS NOTES
[facility]/  Rheumatology  [address], [address]  [place] [address]  Phone: [phone]  Fax: [phone]    Rheumatology Progress Note 05/06/2025      Amber Valdez is a 64 y.o. female here for follow up.    Last Visit: 2/7/24    Rheum Hx      Previous Tx    Health Maintenance  DXA T-0.8 (R hip; 10/2022); FRAX 6.7%/0.3%  Malignancy Hx- none  Immunization History   Administered Date(s) Administered    Flu vaccine (IIV4), preservative free *Check age/dose* 09/21/2020, 10/07/2022, 10/17/2023    Pfizer Purple Cap SARS-CoV-2 03/16/2021, 04/08/2021, 11/27/2021    Pneumococcal conjugate vaccine, 20-valent (PREVNAR 20) 05/13/2022    Td vaccine, age 7 years and older (TDVAX) 06/01/1998    Td vaccine, age 7 years and older (TENIVAC) 05/13/2022          Past Medical History:   Diagnosis Date    Hepatic cyst     Psoriasis     Psoriatic arthritis (Multi)       Past Surgical History:   Procedure Laterality Date    BI STEREOTACTIC GUIDED BREAST LEFT LOCALIZATION Left 12/21/2012    BI STEREOTACTIC GUIDED BREAST LOCALIZATION LEFT [facility]    BREAST BIOPSY Right     x3    BREAST BIOPSY Left     x5    TONSILLECTOMY  06/26/2015    Tonsillectomy    WISDOM TOOTH EXTRACTION        Current Outpatient Medications   Medication Sig Dispense Refill    apremilast (Otezla) 30 mg tablet Take one (1) tablet by mouth twice daily.  Do not crush, chew, or split tablets. 60 tablet 11    MULTIVITAMIN ORAL Take 1 tablet by mouth once daily.      ascorbic acid (Vitamin C) 1,000 mg tablet Take 1 tablet (1,000 mg) by mouth once daily. (Patient not taking: Reported on 5/6/2025)       No current facility-administered medications for this visit.      No Known Allergies     Vitals:    05/06/25 1324   BP: 145/77   Pulse: 81   SpO2: 98%   Weight: 78 kg (172 lb)           Rapid 3  Function Score (FN): 0  Pain Score (PN) (0-10): 0.5  Patient Global (PTGL) (0-10): 0  Rapid3 Score: 0.5  RAPID3 Weighted Score:  0.17       Workup  Component      Latest Ref Rn 10/23/2024   WBC      4.4 - 11.3 x10*3/uL 6.5    nRBC      0.0 - 0.0 /100 WBCs 0.0    RBC      4.00 - 5.20 x10*6/uL 4.27    HEMOGLOBIN      12.0 - 16.0 g/dL 13.6    HEMATOCRIT      36.0 - 46.0 % 40.8    MCV      80 - 100 fL 96    MCH      26.0 - 34.0 pg 31.9    MCHC      32.0 - 36.0 g/dL 33.3    RED CELL DISTRIBUTION WIDTH      11.5 - 14.5 % 11.9    Platelets      150 - 450 x10*3/uL 242    Neutrophils %      40.0 - 80.0 % 57.9    Immature Granulocytes %, Automated      0.0 - 0.9 % 0.3    Lymphocytes %      13.0 - 44.0 % 32.5    Monocytes %      2.0 - 10.0 % 7.6    Eosinophils %      0.0 - 6.0 % 0.8    Basophils %      0.0 - 2.0 % 0.9    Neutrophils Absolute      1.20 - 7.70 x10*3/uL 3.76    Immature Granulocytes Absolute, Automated      0.00 - 0.70 x10*3/uL 0.02    Lymphocytes Absolute      1.20 - 4.80 x10*3/uL 2.11    Monocytes Absolute      0.10 - 1.00 x10*3/uL 0.49    Eosinophils Absolute      0.00 - 0.70 x10*3/uL 0.05    Basophils Absolute      0.00 - 0.10 x10*3/uL 0.06    GLUCOSE      74 - 99 mg/dL 79    SODIUM      136 - 145 mmol/L 138    POTASSIUM      3.5 - 5.3 mmol/L 3.8    CHLORIDE      98 - 107 mmol/L 103    Bicarbonate      21 - 32 mmol/L 30    Anion Gap      10 - 20 mmol/L 9 (L)    Blood Urea Nitrogen      6 - 23 mg/dL 12    Creatinine      0.50 - 1.05 mg/dL 0.85    EGFR      >60 mL/min/1.73m*2 78    Calcium      8.6 - 10.3 mg/dL 9.4    Albumin      3.4 - 5.0 g/dL 4.2    Alkaline Phosphatase      33 - 136 U/L 86    Total Protein      6.4 - 8.2 g/dL 7.2    AST      9 - 39 U/L 29    Bilirubin Total      0.0 - 1.2 mg/dL 0.7    ALT      7 - 45 U/L 28    Color, Urine      Light-Yellow, Yellow, Dark-Yellow  Light-Yellow    Appearance, Urine      Clear  Clear    Specific Gravity, Urine      1.005 - 1.035  1.009    pH, Urine      5.0, 5.5, 6.0, 6.5, 7.0, 7.5, 8.0  6.5    Protein, Urine      NEGATIVE, 10 (TRACE), 20 (TRACE) mg/dL NEGATIVE    Glucose, Urine       Normal mg/dL Normal    Blood, Urine      NEGATIVE  NEGATIVE    Ketones, Urine      NEGATIVE mg/dL NEGATIVE    Bilirubin, Urine      NEGATIVE  NEGATIVE    Urobilinogen, Urine      Normal mg/dL Normal    Nitrite, Urine      NEGATIVE  NEGATIVE    Leukocyte Esterase, Urine      NEGATIVE  NEGATIVE    C-Reactive Protein      <1.00 mg/dL 0.59    Creatine Kinase      0 - 215 U/L 87    Sed Rate      0 - 30 mm/h 20    Scan Result See Scanned Result    Vitamin D, 25-Hydroxy, Total      30 - 100 ng/mL 38    Extra Tube Hold for add-ons.        Component      Latest Ref Rn 3/27/2025   WHITE BLOOD CELL COUNT      3.8 - 10.8 Thousand/uL 5.6    RED BLOOD CELL COUNT      3.80 - 5.10 Million/uL 4.53    HEMOGLOBIN      11.7 - 15.5 g/dL 14.3    HEMATOCRIT      35.0 - 45.0 % 42.9    MCV      80.0 - 100.0 fL 94.7    MCH      27.0 - 33.0 pg 31.6    MCHC      32.0 - 36.0 g/dL 33.3    RDW      11.0 - 15.0 % 12.0    PLATELET COUNT      140 - 400 Thousand/uL 234    MPV      7.5 - 12.5 fL 9.8    ABSOLUTE NEUTROPHILS      1,500 - 7,800 cells/uL 3,259    ABSOLUTE LYMPHOCYTES      850 - 3,900 cells/uL 1,730    ABSOLUTE MONOCYTES      200 - 950 cells/uL 482    ABSOLUTE EOSINOPHILS      15 - 500 cells/uL 90    ABSOLUTE BASOPHILS      0 - 200 cells/uL 39    NEUTROPHILS      % 58.2    LYMPHOCYTES      % 30.9    MONOCYTES      % 8.6    EOSINOPHILS      % 1.6    BASOPHILS      % 0.7    COLOR      YELLOW  YELLOW    APPEARANCE      CLEAR  CLEAR    SPECIFIC GRAVITY      1.001 - 1.035  1.017    PH      5.0 - 8.0  5.5    GLUCOSE      NEGATIVE  NEGATIVE    BILIRUBIN      NEGATIVE  NEGATIVE    KETONES      NEGATIVE  NEGATIVE    OCCULT BLOOD      NEGATIVE  NEGATIVE    PROTEIN      NEGATIVE  NEGATIVE    NITRITE      NEGATIVE  NEGATIVE    LEUKOCYTE ESTERASE      NEGATIVE  NEGATIVE    WBC      < OR = 5 /HPF NONE SEEN    RBC      < OR = 2 /HPF NONE SEEN    SQUAMOUS EPITHELIAL CELLS      < OR = 5 /HPF NONE SEEN    BACTERIA      NONE SEEN /HPF NONE SEEN    HYALINE  CAST      NONE SEEN /LPF NONE SEEN    NOTE --    CULTURE, URINE, ROUTINE --    GLUCOSE      65 - 139 mg/dL 88    UREA NITROGEN (BUN)      7 - 25 mg/dL 14    CREATININE      0.50 - 1.05 mg/dL 0.75    EGFR      > OR = 60 mL/min/1.73m2 90    SODIUM      135 - 146 mmol/L 142    POTASSIUM      3.5 - 5.3 mmol/L 4.0    CHLORIDE      98 - 110 mmol/L 105    CARBON DIOXIDE      20 - 32 mmol/L 28    ELECTROLYTE BALANCE      7 - 17 mmol/L (calc) 9    CALCIUM      8.6 - 10.4 mg/dL 9.2    PROTEIN, TOTAL      6.1 - 8.1 g/dL 7.3    ALBUMIN      3.6 - 5.1 g/dL 4.4    BILIRUBIN, TOTAL      0.2 - 1.2 mg/dL 0.6    ALKALINE PHOSPHATASE      37 - 153 U/L 88    AST      10 - 35 U/L 23    ALT      6 - 29 U/L 21    CREATINE KINASE, TOTAL      20 - 243 U/L 60    SED RATE BY MODIFIED WESTERGREN      < OR = 30 mm/h 19    C-REACTIVE PROTEIN      <8.0 mg/L 3.5    VITAMIN D,25-OH,TOTAL,IA      30 - 100 ng/mL 42        Assessment/Plan  No diagnosis found.       No orders of the defined types were placed in this encounter.         Since last appt, adherent and tolerating Otezla 60 mg daily.  Denies any recent or current infection.  Not on any NSAIDs or glucocorticoids.  ROS neg  Rapid 3 consistent with at remission.  Labs reviewed  D/w pt tx options and decided on   Continue Otezla.  Replete vit D  DXA  Exercise  Advised of possible side effects and importance of monitoring.   All questions answered.  Patient to follow up with primary care provider regarding all other medical issues not addressed today and for medical chart updating.         Since last appt, adherent and tolerating Otezla 30 mg daily.  To confirm, albe to tolearte 1 a day and it works.  Denies any recent or current infection.  Not on any NSAIDs or glucocorticoids.  ROS neg  Rapid 3 consistent with at/near remission  Labs reviewed  D/w pt tx options and decided on   Advised of possible side effects and importance of monitoring.   All questions answered.  Patient to follow up with  primary care provider regarding all other medical issues not addressed today and for medical chart updating.     Allison Adkins MD      Patient Care Team:  SELINA Ribeiro as PCP - General  MD Tahmina Reyes DO as Primary Care Provider  SELINA Ribeiro as Primary Care Provider  Allison Adkins MD as Consulting Physician (Rheumatology)

## 2025-05-07 ENCOUNTER — APPOINTMENT (OUTPATIENT)
Dept: RHEUMATOLOGY | Facility: CLINIC | Age: 65
End: 2025-05-07
Payer: COMMERCIAL

## 2025-05-14 ENCOUNTER — TELEPHONE (OUTPATIENT)
Facility: CLINIC | Age: 65
End: 2025-05-14
Payer: COMMERCIAL

## 2025-05-14 DIAGNOSIS — R92.8 ABNORMALITY OF RIGHT BREAST ON SCREENING MAMMOGRAM: ICD-10-CM

## 2025-05-14 DIAGNOSIS — Z12.31 SCREENING MAMMOGRAM FOR BREAST CANCER: Primary | ICD-10-CM

## 2025-05-16 NOTE — TELEPHONE ENCOUNTER
Pt states insurance will not cover mammogram, but they will cover ultrasound for the breasts. Could you change the imaging order?

## 2025-05-27 PROCEDURE — RXMED WILLOW AMBULATORY MEDICATION CHARGE

## 2025-05-29 ENCOUNTER — SPECIALTY PHARMACY (OUTPATIENT)
Dept: PHARMACY | Facility: CLINIC | Age: 65
End: 2025-05-29

## 2025-05-30 ENCOUNTER — PHARMACY VISIT (OUTPATIENT)
Dept: PHARMACY | Facility: CLINIC | Age: 65
End: 2025-05-30
Payer: COMMERCIAL

## 2025-06-09 ENCOUNTER — HOSPITAL ENCOUNTER (OUTPATIENT)
Dept: RADIOLOGY | Facility: HOSPITAL | Age: 65
Discharge: HOME | End: 2025-06-09
Payer: COMMERCIAL

## 2025-06-09 DIAGNOSIS — R92.8 ABNORMALITY OF RIGHT BREAST ON SCREENING MAMMOGRAM: ICD-10-CM

## 2025-06-09 PROCEDURE — 76982 USE 1ST TARGET LESION: CPT | Mod: RT

## 2025-06-09 PROCEDURE — 76642 ULTRASOUND BREAST LIMITED: CPT | Mod: RIGHT SIDE | Performed by: RADIOLOGY

## 2025-06-09 PROCEDURE — 76642 ULTRASOUND BREAST LIMITED: CPT | Mod: RT

## 2025-06-25 ENCOUNTER — SPECIALTY PHARMACY (OUTPATIENT)
Dept: PHARMACY | Facility: CLINIC | Age: 65
End: 2025-06-25

## 2025-06-25 PROCEDURE — RXMED WILLOW AMBULATORY MEDICATION CHARGE

## 2025-06-30 ENCOUNTER — PHARMACY VISIT (OUTPATIENT)
Dept: PHARMACY | Facility: CLINIC | Age: 65
End: 2025-06-30
Payer: COMMERCIAL

## 2025-07-22 ENCOUNTER — SPECIALTY PHARMACY (OUTPATIENT)
Dept: PHARMACY | Facility: CLINIC | Age: 65
End: 2025-07-22

## 2025-07-22 PROCEDURE — RXMED WILLOW AMBULATORY MEDICATION CHARGE

## 2025-07-24 ENCOUNTER — PHARMACY VISIT (OUTPATIENT)
Dept: PHARMACY | Facility: CLINIC | Age: 65
End: 2025-07-24
Payer: COMMERCIAL

## 2025-08-18 ENCOUNTER — SPECIALTY PHARMACY (OUTPATIENT)
Dept: PHARMACY | Facility: CLINIC | Age: 65
End: 2025-08-18

## 2025-08-19 ENCOUNTER — SPECIALTY PHARMACY (OUTPATIENT)
Dept: PHARMACY | Facility: CLINIC | Age: 65
End: 2025-08-19

## 2025-08-20 ENCOUNTER — APPOINTMENT (OUTPATIENT)
Dept: RADIOLOGY | Facility: HOSPITAL | Age: 65
End: 2025-08-20
Payer: COMMERCIAL

## 2025-08-25 ENCOUNTER — OFFICE VISIT (OUTPATIENT)
Facility: CLINIC | Age: 65
End: 2025-08-25
Payer: COMMERCIAL

## 2025-08-25 ENCOUNTER — SPECIALTY PHARMACY (OUTPATIENT)
Dept: PHARMACY | Facility: CLINIC | Age: 65
End: 2025-08-25

## 2025-08-25 ASSESSMENT — ENCOUNTER SYMPTOMS
LOSS OF SENSATION IN FEET: 0
DEPRESSION: 0
OCCASIONAL FEELINGS OF UNSTEADINESS: 0

## 2025-08-25 ASSESSMENT — PAIN SCALES - GENERAL: PAINLEVEL_OUTOF10: 0-NO PAIN

## 2025-08-25 ASSESSMENT — PATIENT HEALTH QUESTIONNAIRE - PHQ9
1. LITTLE INTEREST OR PLEASURE IN DOING THINGS: NOT AT ALL
2. FEELING DOWN, DEPRESSED OR HOPELESS: NOT AT ALL
SUM OF ALL RESPONSES TO PHQ9 QUESTIONS 1 AND 2: 0

## 2026-05-06 ENCOUNTER — APPOINTMENT (OUTPATIENT)
Facility: CLINIC | Age: 66
End: 2026-05-06
Payer: COMMERCIAL